# Patient Record
Sex: MALE | Race: WHITE | URBAN - METROPOLITAN AREA
[De-identification: names, ages, dates, MRNs, and addresses within clinical notes are randomized per-mention and may not be internally consistent; named-entity substitution may affect disease eponyms.]

---

## 2017-01-17 ENCOUNTER — OFFICE VISIT (OUTPATIENT)
Dept: PEDIATRICS | Facility: CLINIC | Age: 27
End: 2017-01-17
Payer: COMMERCIAL

## 2017-01-17 VITALS
HEART RATE: 98 BPM | WEIGHT: 174.1 LBS | DIASTOLIC BLOOD PRESSURE: 84 MMHG | SYSTOLIC BLOOD PRESSURE: 132 MMHG | TEMPERATURE: 98.8 F | OXYGEN SATURATION: 97 % | HEIGHT: 67 IN | BODY MASS INDEX: 27.33 KG/M2

## 2017-01-17 DIAGNOSIS — F99 INSOMNIA DUE TO OTHER MENTAL DISORDER: ICD-10-CM

## 2017-01-17 DIAGNOSIS — F41.1 GAD (GENERALIZED ANXIETY DISORDER): Primary | ICD-10-CM

## 2017-01-17 DIAGNOSIS — F51.05 INSOMNIA DUE TO OTHER MENTAL DISORDER: ICD-10-CM

## 2017-01-17 PROCEDURE — 99214 OFFICE O/P EST MOD 30 MIN: CPT | Performed by: PHYSICIAN ASSISTANT

## 2017-01-17 RX ORDER — ESCITALOPRAM OXALATE 10 MG/1
TABLET ORAL
Qty: 30 TABLET | Refills: 1 | Status: SHIPPED | OUTPATIENT
Start: 2017-01-17 | End: 2017-02-06

## 2017-01-17 RX ORDER — TRAZODONE HYDROCHLORIDE 50 MG/1
50 TABLET, FILM COATED ORAL AT BEDTIME
Qty: 30 TABLET | Refills: 1 | Status: SHIPPED | OUTPATIENT
Start: 2017-01-17 | End: 2017-02-06

## 2017-01-17 ASSESSMENT — ANXIETY QUESTIONNAIRES
6. BECOMING EASILY ANNOYED OR IRRITABLE: NEARLY EVERY DAY
IF YOU CHECKED OFF ANY PROBLEMS ON THIS QUESTIONNAIRE, HOW DIFFICULT HAVE THESE PROBLEMS MADE IT FOR YOU TO DO YOUR WORK, TAKE CARE OF THINGS AT HOME, OR GET ALONG WITH OTHER PEOPLE: EXTREMELY DIFFICULT
GAD7 TOTAL SCORE: 21
7. FEELING AFRAID AS IF SOMETHING AWFUL MIGHT HAPPEN: NEARLY EVERY DAY
5. BEING SO RESTLESS THAT IT IS HARD TO SIT STILL: NEARLY EVERY DAY
3. WORRYING TOO MUCH ABOUT DIFFERENT THINGS: NEARLY EVERY DAY
2. NOT BEING ABLE TO STOP OR CONTROL WORRYING: NEARLY EVERY DAY
1. FEELING NERVOUS, ANXIOUS, OR ON EDGE: NEARLY EVERY DAY

## 2017-01-17 ASSESSMENT — PATIENT HEALTH QUESTIONNAIRE - PHQ9: 5. POOR APPETITE OR OVEREATING: NEARLY EVERY DAY

## 2017-01-17 NOTE — NURSING NOTE
"Chief Complaint   Patient presents with     Anxiety       Initial /84 mmHg  Pulse 98  Temp(Src) 98.8  F (37.1  C) (Oral)  Ht 5' 7\" (1.702 m)  Wt 174 lb 1.6 oz (78.971 kg)  BMI 27.26 kg/m2  SpO2 97% Estimated body mass index is 27.26 kg/(m^2) as calculated from the following:    Height as of this encounter: 5' 7\" (1.702 m).    Weight as of this encounter: 174 lb 1.6 oz (78.971 kg).  BP completed using cuff size: regular    "

## 2017-01-17 NOTE — PROGRESS NOTES
"  SUBJECTIVE:                                                    Higinio Ball is a 26 year old male who presents to clinic today for the following health issues:    Abnormal Mood Symptoms     Onset: all his life, worse the last few months     Description:   Depression: YES  Anxiety: YES    Accompanying Signs & Symptoms:  Still participating in activities that you used to enjoy: YES/ not as much as normal  Fatigue: YES  Unmotivated to get tasks completed  Irritability: YES  Lack of patience  Difficulty concentrating: YES- sometimes  Changes in appetite: YES- eating a lot less  Problems with sleep: YES--difficulty falling asleep but better with melatonin and zquil. Awakes at night. Racing thoughts. Unable to sleep well.  Heart racing/beating fast : YES- sometimes  No panic attacks.  Thoughts of hurting yourself or others: none  Overworried. Overanalyze.    History:   Recent stress: YES- a new position at work--promotion.   Works in sales.   Hours 8-5 M-F  Prior depression hospitalization: None  Family history of depression: YES- mom  Family history of anxiety: no    Precipitating factors:   Alcohol/drug use: no  Tobacco use: 10/22/16 quit. E-cig.     Alleviating factors:  nothing       Therapies Tried and outcome: None    Two children--9 and 4. Custody concerns.  No exercise.   Support system: father, girlfriend, Jew occasionally.  Counseling: no. Not opposed to therapy.   Physical this past year. Labs completed including thyroid.    Problem list, Medication list, Allergies, and Medical/Social/Surgical histories reviewed in [facility] and updated as appropriate.    ROS:  ROS otherwise negative    OBJECTIVE:                                                    /84 mmHg  Pulse 98  Temp(Src) 98.8  F (37.1  C) (Oral)  Ht 5' 7\" (1.702 m)  Wt 174 lb 1.6 oz (78.971 kg)  BMI 27.26 kg/m2  SpO2 97%  Body mass index is 27.26 kg/(m^2).   GENERAL: alert, no distress  NECK: no tenderness, no adenopathy  RESP: lungs clear " to auscultation - no rales, no rhonchi, no wheezes  CV: regular rates and rhythm, normal S1 S2, no S3 or S4 and no murmur, no click or rub  Psych:  anxious and worried    Diagnostic test results:  No results found for this or any previous visit (from the past 24 hour(s)).     ASSESSMENT/PLAN:                                                    (F41.1) VIVI (generalized anxiety disorder)  (primary encounter diagnosis)  Comment: discussed etiology, symptoms, treatment options in detail. Begin medication--1/2 tab x one week, then increase to 1 tab daily. Discussed SE.   Close follow up in two weeks. Referral for counseling completed.   Plan: escitalopram (LEXAPRO) 10 MG tablet, MENTAL         HEALTH REFERRAL            (F51.05,  F99) Insomnia due to other mental disorder  Comment: due to VIVI. Begin trazodone nightly.   Plan: traZODone (DESYREL) 50 MG tablet, MENTAL HEALTH        REFERRAL          Greater than 25 minutes was spent with patient today with the majority spent on counseling and coordination of care for the conditions listed above.  See Patient Instructions    Domingo Wilson PA-C  [facility] [place]

## 2017-01-17 NOTE — PATIENT INSTRUCTIONS
"               Generalized Anxiety Disorder  What is generalized anxiety disorder?   Generalized anxiety disorder (VIVI) is a condition in which a person worries excessively and unrealistically. They may also be jittery, restless, or dizzy. When these symptoms last for at least 6 months, a diagnosis of VIVI may be made.  VIVI may exist by itself, or with both anxiety and depression. It is estimated that almost 5% of people have had this disorder during their lives.  How does it occur?   The cause of VIVI is unknown. Genetic and environmental factors play a role. Women have VIVI about twice as often as men.  The worry in VIVI is not about panic attacks or being afraid in public places. It is typically \"free-floating\" anxiety out of proportion to any real life situation. The worrying can interfere with normal day-to-day activities and work or school.  What are the symptoms?   Symptoms include excessive, unrealistic, and uncontrollable worrying about many things such as:  the state of the world   the economy   violence in society   your job   the bills   chores   family members  Physical symptoms such as muscle tension, sleep problems, or feeling on edge usually go along with anxiety. A person may be short-tempered and unable to focus or concentrate because of the worrying. Other symptoms include sweating, shaking, having a very fast heartbeat, feeling out of breath, needing to go to the bathroom often and feeling like fainting. People with VIVI may be uneasy in a group or in a waiting room.  How is it diagnosed?   There is no lab test for VIVI. Your healthcare provider or therapist will ask about your symptoms. He or she will make sure you do not have a medical illness or drug or alcohol problem that could cause the symptoms. Some medicines can cause anxiety or make it worse. These include asthma medicines, stimulants, and steroids such as prednisone.  If you have had the symptoms for at least 6 months, if you have had to cut " back on your activities, and if you find it difficult to get things done, you may be diagnosed with generalized anxiety disorder.  How is it treated?   Different types of approaches have proven helpful in treating VIVI. These include medicine, behavior therapy, relaxation therapy, cognitive therapy, and stress management techniques. Which treatments your healthcare provider or therapist uses may depend upon how much the disorder interferes with your day-to-day life.  Several types of medicines can help treat VIVI. Your healthcare provider will work with you to carefully select the best one for you.  How long will the effects last?   VIVI can last many years and sometimes an entire lifetime.   How can I take care of myself?   Get support. Talk with family and friends. Consider joining a support group in your area. Go to a stress management class in your local community.   Learn to manage stress. Ask for help at home and work when the load is too great to handle. Find ways to relax, for example take up a hobby, listen to music, watch movies, take walks. Try deep breathing exercises when you feel stressed.   Take care of your physical health. Try to get at least 7 to 9 hours of sleep each night. Eat a healthy diet. Limit caffeine. If you smoke, quit. Avoid alcohol and drugs, because they can make your symptoms worse. Exercise according to your healthcare provider's instructions.   Check your medicines. To help prevent problems, tell your healthcare provider and pharmacist about all the medicines, natural remedies, vitamins, and other supplements that you take.   Contact your healthcare provider or therapist if you have any questions or your symptoms seem to be getting worse.  You may also want to contact [facility] (formerly the National Mental Health Association or NMHA). [facility]'s toll-free Information Center number is 0-663-269-[facility]. Its web site address is http://www.NMHA.org

## 2017-01-17 NOTE — MR AVS SNAPSHOT
"              After Visit Summary   1/17/2017    Higinio Ball    MRN: [id number]           Patient Information     Date Of Birth          1990        Visit Information        Provider Department      1/17/2017 10:30 AM Domingo Wilson PA-C [facility]        Today's Diagnoses     VIVI (generalized anxiety disorder)    -  1     Insomnia due to other mental disorder           Care Instructions                   Generalized Anxiety Disorder  What is generalized anxiety disorder?   Generalized anxiety disorder (VIVI) is a condition in which a person worries excessively and unrealistically. They may also be jittery, restless, or dizzy. When these symptoms last for at least 6 months, a diagnosis of VIVI may be made.  VIVI may exist by itself, or with both anxiety and depression. It is estimated that almost 5% of people have had this disorder during their lives.  How does it occur?   The cause of VIVI is unknown. Genetic and environmental factors play a role. Women have VIVI about twice as often as men.  The worry in VIVI is not about panic attacks or being afraid in public places. It is typically \"free-floating\" anxiety out of proportion to any real life situation. The worrying can interfere with normal day-to-day activities and work or school.  What are the symptoms?   Symptoms include excessive, unrealistic, and uncontrollable worrying about many things such as:  the state of the world   the economy   violence in society   your job   the bills   chores   family members  Physical symptoms such as muscle tension, sleep problems, or feeling on edge usually go along with anxiety. A person may be short-tempered and unable to focus or concentrate because of the worrying. Other symptoms include sweating, shaking, having a very fast heartbeat, feeling out of breath, needing to go to the bathroom often and feeling like fainting. People with VIVI may be uneasy in a group or in a waiting room.  How is it " diagnosed?   There is no lab test for VIVI. Your healthcare provider or therapist will ask about your symptoms. He or she will make sure you do not have a medical illness or drug or alcohol problem that could cause the symptoms. Some medicines can cause anxiety or make it worse. These include asthma medicines, stimulants, and steroids such as prednisone.  If you have had the symptoms for at least 6 months, if you have had to cut back on your activities, and if you find it difficult to get things done, you may be diagnosed with generalized anxiety disorder.  How is it treated?   Different types of approaches have proven helpful in treating VIVI. These include medicine, behavior therapy, relaxation therapy, cognitive therapy, and stress management techniques. Which treatments your healthcare provider or therapist uses may depend upon how much the disorder interferes with your day-to-day life.  Several types of medicines can help treat VIVI. Your healthcare provider will work with you to carefully select the best one for you.  How long will the effects last?   VIVI can last many years and sometimes an entire lifetime.   How can I take care of myself?   Get support. Talk with family and friends. Consider joining a support group in your area. Go to a stress management class in your local community.   Learn to manage stress. Ask for help at home and work when the load is too great to handle. Find ways to relax, for example take up a hobby, listen to music, watch movies, take walks. Try deep breathing exercises when you feel stressed.   Take care of your physical health. Try to get at least 7 to 9 hours of sleep each night. Eat a healthy diet. Limit caffeine. If you smoke, quit. Avoid alcohol and drugs, because they can make your symptoms worse. Exercise according to your healthcare provider's instructions.   Check your medicines. To help prevent problems, tell your healthcare provider and pharmacist about all the medicines,  natural remedies, vitamins, and other supplements that you take.   Contact your healthcare provider or therapist if you have any questions or your symptoms seem to be getting worse.  You may also want to contact [facility] (formerly the National Mental Health Association or [facility]). [facility]'s toll-free Information Center number is 2-512-826-[facility]. Its web site address is [email or web address]            Follow-ups after your visit        Additional Services     MENTAL HEALTH REFERRAL       Your provider has referred you to: [facility]: [facility] - Counseling (Individual/Couples/Family) - [facility] [place] [phone]   *Patient will be contacted by [facility] scheduling partner[facility] to schedule an appointment.  Patients may also call [facility] to schedule.    All scheduling is subject to the client's specific insurance plan & benefits, provider/location availability, and provider clinical specialities.  Please arrive 15 minutes early for your first appointment and bring your completed paperwork.    Please be aware that coverage of these services is subject to the terms and limitations of your health insurance plan.  Call member services at your health plan with any benefit or coverage questions.                  Who to contact     If you have questions or need follow up information about today's clinic visit or your schedule please contact [facility] directly at [phone].  Normal or non-critical lab and imaging results will be communicated to you by MyChart, letter or phone within 4 business days after the clinic has received the results. If you do not hear from us within 7 days, please contact the clinic through MyChart or phone. If you have a critical or abnormal lab result, we will notify you by phone as soon as possible.  Submit refill requests through [employer] or call your pharmacy and they will forward the refill request to us. Please allow 3  "business days for your refill to be completed.          Additional Information About Your Visit        MyChart Information     [employer] lets you send messages to your doctor, view your test results, renew your prescriptions, schedule appointments and more. To sign up, go to [email or web address] . Click on \"Log in\" on the left side of the screen, which will take you to the Welcome page. Then click on \"Sign up Now\" on the right side of the page.     You will be asked to enter the access code listed below, as well as some personal information. Please follow the directions to create your username and password.     Your access code is: [email or web address]  Expires: 2017 10:59 AM     Your access code will  in 90 days. If you need help or a new code, please call your [place] clinic or [phone].        Care EveryWhere ID     This is your Care EveryWhere ID. This could be used by other organizations to access your [place] medical records  [id number]        Your Vitals Were     Pulse Temperature Height BMI (Body Mass Index) Pulse Oximetry       98 98.8  F (37.1  C) (Oral) 5' 7\" (1.702 m) 27.26 kg/m2 97%        Blood Pressure from Last 3 Encounters:   17 132/84   10/02/16 152/99   16 156/112    Weight from Last 3 Encounters:   17 174 lb 1.6 oz (78.971 kg)   16 172 lb (78.019 kg)   16 171 lb 11.2 oz (77.883 kg)              We Performed the Following     MENTAL HEALTH REFERRAL          Today's Medication Changes          These changes are accurate as of: 17 10:59 AM.  If you have any questions, ask your nurse or doctor.               Start taking these medicines.        Dose/Directions    escitalopram 10 MG tablet   Commonly known as:  LEXAPRO   Used for:  VIVI (generalized anxiety disorder)   Started by:  Domingo Wilson PA-C        Begin 1/2 tablet x one week, then increase to 1 tablet po daily   Quantity:  30 tablet   Refills:  1       traZODone 50 MG tablet "   Commonly known as:  DESYREL   Used for:  Insomnia due to other mental disorder   Started by:  Domingo Wilson PA-C        Dose:  50 mg   Take 1 tablet (50 mg) by mouth At Bedtime   Quantity:  30 tablet   Refills:  1            Where to get your medicines      These medications were sent to [facility] [place] - [place] - [address]   [address], [place] [address]     Phone:  [phone]    - escitalopram 10 MG tablet  - traZODone 50 MG tablet             Primary Care Provider Office Phone # Fax #    Alfonso Callahan [phone]       [facility] [place] [address] [place] [address]        Thank you!     [facility]. Our goal is always to provide you with excellent care. Hearing back from our patients is one way we can continue to improve our services. Please take a few minutes to complete the written survey that you may receive in the mail after your visit with us. Thank you!             Your Updated Medication List - Protect others around you: Learn how to safely use, store and throw away your medicines at www.disposemymeds.org.          This list is accurate as of: 1/17/17 10:59 AM.  Always use your most recent med list.                   Brand Name Dispense Instructions for use    escitalopram 10 MG tablet    LEXAPRO    30 tablet    Begin 1/2 tablet x one week, then increase to 1 tablet po daily       NO ACTIVE MEDICATIONS      Per patient       traZODone 50 MG tablet    DESYREL    30 tablet    Take 1 tablet (50 mg) by mouth At Bedtime

## 2017-01-18 ASSESSMENT — ANXIETY QUESTIONNAIRES: GAD7 TOTAL SCORE: 21

## 2017-01-18 ASSESSMENT — PATIENT HEALTH QUESTIONNAIRE - PHQ9: SUM OF ALL RESPONSES TO PHQ QUESTIONS 1-9: 22

## 2017-01-31 NOTE — PROGRESS NOTES
"  SUBJECTIVE:                                                    Higinio Ball is a 26 year old male who presents to clinic today for the following health issues:      Medication Followup of Lexapro 10mg    Taking Medication as prescribed: yes    Side Effects:   Upset stomach when taking full pill.     Medication Helping Symptoms:  yes         Higinio comes in for follow-up of his anxiety. He reports that this has actually gotten much better since starting Lexapro a couple of weeks ago. He is currently taking 5mg daily; he tried increasing to 10mg one day but had some stomach upset with this.    He tried trazodone for sleeping, which made him feel too groggy in the morning (until at least 10am or noon). He is currently using melatonin with good results.     Things at home are going well. Recently adopted a puppy which is actually quite stress-relieving for him.     Problem list and histories reviewed & adjusted, as indicated.  Additional history: as documented    Problem list, Medication list, Allergies, and Medical/Social/Surgical histories reviewed in [facility] and updated as appropriate.    ROS:  Constitutional, psych systems are negative, except as otherwise noted.    OBJECTIVE:                                                    /70 mmHg  Pulse 85  Temp(Src) 98.3  F (36.8  C) (Oral)  Resp 16  Ht 5' 7\" (1.702 m)  Wt 174 lb 3 oz (79.011 kg)  BMI 27.28 kg/m2  SpO2 97%  Body mass index is 27.28 kg/(m^2).  GENERAL: healthy, alert and no distress  PSYCH: mentation appears normal, affect normal/bright    Diagnostic Test Results:  none      ASSESSMENT/PLAN:                                                      1. VIVI (generalized anxiety disorder)  Improving control on low dose escitalopram. He did have mild side effects when trying to increase the dose, and as he is tolerating lower dose well, reasonable to remain at this dose for now. Follow-up in 6 months, sooner if symptoms worsen and can consider increasing " dose at that time. Would like to hold off on CBT at this time.   - escitalopram (LEXAPRO) 10 MG tablet; Take 0.5 tablets (5 mg) by mouth daily Begin 1/2 tablet x one week, then increase to 1 tablet po daily  Dispense: 90 tablet; Refill: 1    2. Insomnia, unspecified type  Doing well with melatonin. Significant side effects with trazodone.      Patient Instructions   Continue Lexapro 1/2 tab daily. We can always increase to a full tab if needed.    Continue melatonin for sleep.     Let us know if you want to start therapy, we can give you a phone number to call to set this up.    Follow-up at least annually for anxiety.         Karina López MD  [facility]

## 2017-02-06 ENCOUNTER — OFFICE VISIT (OUTPATIENT)
Dept: PEDIATRICS | Facility: CLINIC | Age: 27
End: 2017-02-06
Payer: COMMERCIAL

## 2017-02-06 VITALS
DIASTOLIC BLOOD PRESSURE: 70 MMHG | SYSTOLIC BLOOD PRESSURE: 120 MMHG | HEART RATE: 85 BPM | TEMPERATURE: 98.3 F | OXYGEN SATURATION: 97 % | WEIGHT: 174.19 LBS | HEIGHT: 67 IN | BODY MASS INDEX: 27.34 KG/M2 | RESPIRATION RATE: 16 BRPM

## 2017-02-06 DIAGNOSIS — F41.1 GAD (GENERALIZED ANXIETY DISORDER): Primary | ICD-10-CM

## 2017-02-06 DIAGNOSIS — G47.00 INSOMNIA, UNSPECIFIED TYPE: ICD-10-CM

## 2017-02-06 PROCEDURE — 99213 OFFICE O/P EST LOW 20 MIN: CPT | Performed by: INTERNAL MEDICINE

## 2017-02-06 RX ORDER — ESCITALOPRAM OXALATE 10 MG/1
5 TABLET ORAL DAILY
Qty: 90 TABLET | Refills: 1 | Status: SHIPPED | OUTPATIENT
Start: 2017-02-06 | End: 2017-11-28

## 2017-02-06 NOTE — MR AVS SNAPSHOT
"              After Visit Summary   2/6/2017    Higinio Ball    MRN: [id number]           Patient Information     Date Of Birth          1990        Visit Information        Provider Department      2/6/2017 1:10 PM Karina Garay MD [facility]        Today's Diagnoses     VIVI (generalized anxiety disorder)    -  1       Care Instructions    Continue Lexapro 1/2 tab daily. We can always increase to a full tab if needed.    Continue melatonin for sleep.     Let us know if you want to start therapy, we can give you a phone number to call to set this up.    Follow-up at least annually for anxiety.         Follow-ups after your visit        Who to contact     If you have questions or need follow up information about today's clinic visit or your schedule please contact [facility] directly at [phone].  Normal or non-critical lab and imaging results will be communicated to you by [email or web address], letter or phone within 4 business days after the clinic has received the results. If you do not hear from us within 7 days, please contact the clinic through [email or web address] or phone. If you have a critical or abnormal lab result, we will notify you by phone as soon as possible.  Submit refill requests through [employer] or call your pharmacy and they will forward the refill request to us. Please allow 3 business days for your refill to be completed.          Additional Information About Your Visit        MyChart Information     [employer] lets you send messages to your doctor, view your test results, renew your prescriptions, schedule appointments and more. To sign up, go to [email or web address] . Click on \"Log in\" on the left side of the screen, which will take you to the Welcome page. Then click on \"Sign up Now\" on the right side of the page.     You will be asked to enter the access code listed below, as well as some personal information. Please follow the directions to create your username and password.   " "  Your access code is: [email or web address]  Expires: 2017 10:59 AM     Your access code will  in 90 days. If you need help or a new code, please call your [place] clinic or [phone].        Care EveryWhere ID     This is your Care EveryWhere ID. This could be used by other organizations to access your [place] medical records  [id number]        Your Vitals Were     Pulse Temperature Respirations Height BMI (Body Mass Index) Pulse Oximetry    85 98.3  F (36.8  C) (Oral) 16 5' 7\" (1.702 m) 27.28 kg/m2 97%       Blood Pressure from Last 3 Encounters:   17 120/70   17 132/84   10/02/16 152/99    Weight from Last 3 Encounters:   17 174 lb 3 oz (79.011 kg)   17 174 lb 1.6 oz (78.971 kg)   16 172 lb (78.019 kg)              Today, you had the following     No orders found for display         Today's Medication Changes          These changes are accurate as of: 17  1:27 PM.  If you have any questions, ask your nurse or doctor.               These medicines have changed or have updated prescriptions.        Dose/Directions    escitalopram 10 MG tablet   Commonly known as:  LEXAPRO   This may have changed:    - how much to take  - how to take this  - when to take this   Used for:  VIVI (generalized anxiety disorder)   Changed by:  Karina Gaary MD        Dose:  5 mg   Take 0.5 tablets (5 mg) by mouth daily Begin 1/2 tablet x one week, then increase to 1 tablet po daily   Quantity:  90 tablet   Refills:  1         Stop taking these medicines if you haven't already. Please contact your care team if you have questions.     traZODone 50 MG tablet   Commonly known as:  DESYREL   Stopped by:  Karina Garay MD                Where to get your medicines      These medications were sent to [facility] - [address]   [address], [place] [address]     Phone:  [phone]    - escitalopram 10 MG tablet             Primary " Care Provider Office Phone # Fax #    Alfonso Callahan [phone]       [facility] [address] [place] [address]        Thank you!     Thank you for choosing [facility]  for your care. Our goal is always to provide you with excellent care. Hearing back from our patients is one way we can continue to improve our services. Please take a few minutes to complete the written survey that you may receive in the mail after your visit with us. Thank you!             Your Updated Medication List - Protect others around you: Learn how to safely use, store and throw away your medicines at www.disposemymeds.org.          This list is accurate as of: 2/6/17  1:27 PM.  Always use your most recent med list.                   Brand Name Dispense Instructions for use    escitalopram 10 MG tablet    LEXAPRO    90 tablet    Take 0.5 tablets (5 mg) by mouth daily Begin 1/2 tablet x one week, then increase to 1 tablet po daily

## 2017-02-06 NOTE — PATIENT INSTRUCTIONS
Continue Lexapro 1/2 tab daily. We can always increase to a full tab if needed.    Continue melatonin for sleep.     Let us know if you want to start therapy, we can give you a phone number to call to set this up.    Follow-up at least annually for anxiety.

## 2017-02-06 NOTE — NURSING NOTE
"Chief Complaint   Patient presents with     Anxiety     med check        Initial /70 mmHg  Pulse 85  Temp(Src) 98.3  F (36.8  C) (Oral)  Resp 16  Ht 5' 7\" (1.702 m)  Wt 174 lb 3 oz (79.011 kg)  BMI 27.28 kg/m2  SpO2 97% Estimated body mass index is 27.28 kg/(m^2) as calculated from the following:    Height as of this encounter: 5' 7\" (1.702 m).    Weight as of this encounter: 174 lb 3 oz (79.011 kg).  Medication Reconciliation: complete     Rima Novoa MA 2/6/2017 1:17 PM      "

## 2017-02-08 PROBLEM — G47.00 INSOMNIA, UNSPECIFIED TYPE: Status: ACTIVE | Noted: 2017-02-08

## 2017-02-15 PROBLEM — F41.1 GAD (GENERALIZED ANXIETY DISORDER): Status: ACTIVE | Noted: 2017-02-06

## 2017-02-15 PROBLEM — F41.1 GAD (GENERALIZED ANXIETY DISORDER): Status: ACTIVE | Noted: 2017-02-15

## 2017-11-28 DIAGNOSIS — F41.1 GAD (GENERALIZED ANXIETY DISORDER): ICD-10-CM

## 2017-11-28 RX ORDER — ESCITALOPRAM OXALATE 10 MG/1
5 TABLET ORAL DAILY
Qty: 90 TABLET | Refills: 1 | Status: SHIPPED | OUTPATIENT
Start: 2017-11-28

## 2017-11-28 NOTE — TELEPHONE ENCOUNTER
Prescription approved per [facility] Refill Protocol.    Donna CALIXTO RN, BSN, PHN  [employer] RN

## 2017-11-28 NOTE — TELEPHONE ENCOUNTER
Lexapro     Last Written Prescription Date: 2/6/17  Last Fill Quantity: 90, # refills: 0  Last Office Visit with [facility] primary care provider:  2/6/17       Last PHQ-9 score on record=   PHQ-9 SCORE 1/17/2017   Total Score -   Total Score 22       Patient would like to pick this prescription up at the [facility] pharmacy today.

## 2017-11-29 RX ORDER — ESCITALOPRAM OXALATE 10 MG/1
TABLET ORAL
Qty: 90 TABLET | Refills: 1 | OUTPATIENT
Start: 2017-11-29

## 2025-08-20 ENCOUNTER — OFFICE VISIT (OUTPATIENT)
Dept: FAMILY MEDICINE | Facility: CLINIC | Age: 35
End: 2025-08-20

## 2025-08-20 VITALS
BODY MASS INDEX: 24.58 KG/M2 | DIASTOLIC BLOOD PRESSURE: 92 MMHG | HEIGHT: 67 IN | WEIGHT: 156.6 LBS | HEART RATE: 80 BPM | SYSTOLIC BLOOD PRESSURE: 123 MMHG | OXYGEN SATURATION: 99 %

## 2025-08-20 DIAGNOSIS — F17.290 VAPING NICOTINE DEPENDENCE, TOBACCO PRODUCT: ICD-10-CM

## 2025-08-20 DIAGNOSIS — Z13.6 SCREENING FOR HEART DISEASE: ICD-10-CM

## 2025-08-20 DIAGNOSIS — Z00.00 ROUTINE GENERAL MEDICAL EXAMINATION AT A HEALTH CARE FACILITY: Primary | ICD-10-CM

## 2025-08-20 LAB
% GRANULOCYTES: 65 % (ref 42.2–75.2)
ALBUMIN SERPL BCG-MCNC: 4.4 G/DL (ref 3.5–5.2)
ALP SERPL-CCNC: 97 U/L (ref 40–150)
ALT SERPL W P-5'-P-CCNC: 22 U/L (ref 0–70)
ANION GAP SERPL CALCULATED.3IONS-SCNC: 13 MMOL/L (ref 7–15)
AST SERPL W P-5'-P-CCNC: 18 U/L (ref 0–45)
BILIRUB SERPL-MCNC: 0.3 MG/DL
BUN SERPL-MCNC: 13.6 MG/DL (ref 6–20)
CALCIUM SERPL-MCNC: 9.3 MG/DL (ref 8.8–10.4)
CHLORIDE SERPL-SCNC: 102 MMOL/L (ref 98–107)
CHOLEST SERPL-MCNC: 211 MG/DL
CREAT SERPL-MCNC: 0.86 MG/DL (ref 0.67–1.17)
EGFRCR SERPLBLD CKD-EPI 2021: >90 ML/MIN/1.73M2
FASTING STATUS PATIENT QL REPORTED: YES
FASTING STATUS PATIENT QL REPORTED: YES
GLUCOSE SERPL-MCNC: 106 MG/DL (ref 70–99)
HCO3 SERPL-SCNC: 24 MMOL/L (ref 22–29)
HCT VFR BLD AUTO: 42.6 % (ref 39–51)
HDLC SERPL-MCNC: 55 MG/DL
HEMOGLOBIN: 15.2 G/DL (ref 13.4–17.5)
LDLC SERPL CALC-MCNC: 146 MG/DL
LYMPHOCYTES NFR BLD AUTO: 26.9 % (ref 20.5–51.1)
MCH RBC QN AUTO: 32.1 PG (ref 27–31)
MCHC RBC AUTO-ENTMCNC: 35.7 G/DL (ref 33–37)
MCV RBC AUTO: 89.9 FL (ref 80–100)
MONOCYTES NFR BLD AUTO: 8.1 % (ref 1.7–9.3)
NONHDLC SERPL-MCNC: 156 MG/DL
PLATELET # BLD AUTO: 212 K/UL (ref 140–450)
POTASSIUM SERPL-SCNC: 3.9 MMOL/L (ref 3.4–5.3)
PROT SERPL-MCNC: 7.5 G/DL (ref 6.4–8.3)
RBC # BLD AUTO: 4.73 X10/CMM (ref 4.2–5.9)
SODIUM SERPL-SCNC: 139 MMOL/L (ref 135–145)
TRIGL SERPL-MCNC: 52 MG/DL
WBC # BLD AUTO: 6 X10/CMM (ref 3.8–11)

## 2025-08-20 PROCEDURE — 3080F DIAST BP >= 90 MM HG: CPT | Performed by: FAMILY MEDICINE

## 2025-08-20 PROCEDURE — 3074F SYST BP LT 130 MM HG: CPT | Performed by: FAMILY MEDICINE

## 2025-08-20 PROCEDURE — 36415 COLL VENOUS BLD VENIPUNCTURE: CPT | Performed by: FAMILY MEDICINE

## 2025-08-20 PROCEDURE — 80053 COMPREHEN METABOLIC PANEL: CPT | Performed by: FAMILY MEDICINE

## 2025-08-20 PROCEDURE — 85025 COMPLETE CBC W/AUTO DIFF WBC: CPT | Performed by: FAMILY MEDICINE

## 2025-08-20 PROCEDURE — 80061 LIPID PANEL: CPT | Performed by: FAMILY MEDICINE

## 2025-08-20 PROCEDURE — 99385 PREV VISIT NEW AGE 18-39: CPT | Performed by: FAMILY MEDICINE

## 2025-08-20 SDOH — HEALTH STABILITY: PHYSICAL HEALTH: ON AVERAGE, HOW MANY DAYS PER WEEK DO YOU ENGAGE IN MODERATE TO STRENUOUS EXERCISE (LIKE A BRISK WALK)?: 6 DAYS

## 2025-08-20 SDOH — HEALTH STABILITY: PHYSICAL HEALTH: ON AVERAGE, HOW MANY MINUTES DO YOU ENGAGE IN EXERCISE AT THIS LEVEL?: 60 MIN

## 2025-08-20 ASSESSMENT — SOCIAL DETERMINANTS OF HEALTH (SDOH): HOW OFTEN DO YOU GET TOGETHER WITH FRIENDS OR RELATIVES?: ONCE A WEEK
